# Patient Record
Sex: FEMALE | Race: WHITE | NOT HISPANIC OR LATINO | URBAN - METROPOLITAN AREA
[De-identification: names, ages, dates, MRNs, and addresses within clinical notes are randomized per-mention and may not be internally consistent; named-entity substitution may affect disease eponyms.]

---

## 2024-10-24 ENCOUNTER — TELEHEALTH PROVIDED OTHER THAN IN PATIENT'S HOME (OUTPATIENT)
Dept: URBAN - METROPOLITAN AREA TELEHEALTH 12 | Facility: TELEHEALTH | Age: 24
End: 2024-10-24

## 2024-10-24 VITALS — WEIGHT: 175 LBS | HEIGHT: 65 IN

## 2024-10-24 DIAGNOSIS — R19.7 DIARRHEA, UNSPECIFIED: ICD-10-CM

## 2024-10-24 DIAGNOSIS — Z79.1 LONG TERM (CURRENT) USE OF NON-STEROIDAL ANTI-INFLAMMATORIES: ICD-10-CM

## 2024-10-24 DIAGNOSIS — K92.2 GASTROINTESTINAL HEMORRHAGE, UNSPECIFIED: ICD-10-CM

## 2024-10-24 DIAGNOSIS — R53.83 OTHER FATIGUE: ICD-10-CM

## 2024-10-24 DIAGNOSIS — K92.1 MELENA: ICD-10-CM

## 2024-10-24 DIAGNOSIS — R11.2 NAUSEA WITH VOMITING, UNSPECIFIED: ICD-10-CM

## 2024-10-24 PROCEDURE — 99204 OFFICE O/P NEW MOD 45 MIN: CPT | Mod: 95 | Performed by: INTERNAL MEDICINE

## 2024-10-24 RX ORDER — OMEPRAZOLE 20 MG/1
TABLET, DELAYED RELEASE ORAL
Qty: 30 | Refills: 3 | Status: ACTIVE
Start: 2024-10-24

## 2025-02-18 ENCOUNTER — TELEHEALTH PROVIDED IN PATIENT'S HOME (OUTPATIENT)
Dept: URBAN - METROPOLITAN AREA TELEHEALTH 12 | Facility: TELEHEALTH | Age: 25
End: 2025-02-18
Payer: COMMERCIAL

## 2025-02-18 VITALS — WEIGHT: 170 LBS | HEIGHT: 65 IN

## 2025-02-18 DIAGNOSIS — R19.5 OTHER FECAL ABNORMALITIES: ICD-10-CM

## 2025-02-18 DIAGNOSIS — D62 ACUTE POSTHEMORRHAGIC ANEMIA: ICD-10-CM

## 2025-02-18 DIAGNOSIS — R19.7 DIARRHEA, UNSPECIFIED: ICD-10-CM

## 2025-02-18 DIAGNOSIS — K51.019 ULCERATIVE (CHRONIC) PANCOLITIS WITH UNSPECIFIED COMPLICATIO: ICD-10-CM

## 2025-02-18 DIAGNOSIS — D84.9 IMMUNODEFICIENCY, UNSPECIFIED: ICD-10-CM

## 2025-02-18 DIAGNOSIS — E66.3 OVERWEIGHT: ICD-10-CM

## 2025-02-18 PROCEDURE — 99214 OFFICE O/P EST MOD 30 MIN: CPT | Mod: 95 | Performed by: PHYSICIAN ASSISTANT

## 2025-02-18 NOTE — SERVICENOTES
Patient was located in their home during visit., I spent 25 minutes today reviewing the chart, talking with the patient, reviewing previous results with patient, discussing plan, and documenting. Patient understands and agrees with plan. Questions/concerns addressed.

## 2025-02-18 NOTE — SERVICEHPINOTES
PATIENT VERIFIED BY DATE OF BIRTH AND NAME. Patient has been consented for this telecommunication visit using [employer] application.   Pt is here for f/u to starting Humira. She started Humira--started first dose 1/24/2025--she already feels "normal".  
yoseph hameed
Currently, pt has regular/formed BMs--now has 2 BM per day--was having up to 14 BMs daily prior to being on Humira. No blood per rectum, abdominal pain, nausea, vomiting, fever, chills, joint pain, or eye pain. 
yoseph hameed
Pt advised to get a yrly skin check, yearly pap smear, non live flu shot, pneumonia shot every 5 yrs. Pt needs hep B vaccine series, and she is on a daily vitamin D supplement. 
yoseph hameedPt is on a PPI x 1 per day down from BID. Doing well with once a day dose. Pt is no longer experiencing any GERD symptoms. Pt will start to wean off of the once daily dose. 
yoseph hameed No SE from the Humira
yoseph hameed Pt asks questions about timing of her next procedure, plan for repeating fecal calpro, etc. 
yoseph hameedROS as per HPI and o/w is unremarkable. No other GI related complaints today. yoseph hameed

## 2025-03-05 LAB
CBC WITH DIFFERENTIAL/PLATELET: BASO (ABSOLUTE): 0 X10E3/UL (ref 0–0.2)
CBC WITH DIFFERENTIAL/PLATELET: BASOS: 1 %
CBC WITH DIFFERENTIAL/PLATELET: EOS (ABSOLUTE): 0.1 X10E3/UL (ref 0–0.4)
CBC WITH DIFFERENTIAL/PLATELET: EOS: 2 %
CBC WITH DIFFERENTIAL/PLATELET: HEMATOCRIT: 37.5 % (ref 34–46.6)
CBC WITH DIFFERENTIAL/PLATELET: HEMATOLOGY COMMENTS: (no result)
CBC WITH DIFFERENTIAL/PLATELET: HEMOGLOBIN: 11.4 G/DL (ref 11.1–15.9)
CBC WITH DIFFERENTIAL/PLATELET: IMMATURE CELLS: (no result)
CBC WITH DIFFERENTIAL/PLATELET: IMMATURE GRANS (ABS): 0 X10E3/UL (ref 0–0.1)
CBC WITH DIFFERENTIAL/PLATELET: IMMATURE GRANULOCYTES: 0 %
CBC WITH DIFFERENTIAL/PLATELET: LYMPHS (ABSOLUTE): 2.8 X10E3/UL (ref 0.7–3.1)
CBC WITH DIFFERENTIAL/PLATELET: LYMPHS: 47 %
CBC WITH DIFFERENTIAL/PLATELET: MCH: 23.7 PG — LOW (ref 26.6–33)
CBC WITH DIFFERENTIAL/PLATELET: MCHC: 30.4 G/DL — LOW (ref 31.5–35.7)
CBC WITH DIFFERENTIAL/PLATELET: MCV: 78 FL — LOW (ref 79–97)
CBC WITH DIFFERENTIAL/PLATELET: MONOCYTES(ABSOLUTE): 0.5 X10E3/UL (ref 0.1–0.9)
CBC WITH DIFFERENTIAL/PLATELET: MONOCYTES: 9 %
CBC WITH DIFFERENTIAL/PLATELET: NEUTROPHILS (ABSOLUTE): 2.4 X10E3/UL (ref 1.4–7)
CBC WITH DIFFERENTIAL/PLATELET: NEUTROPHILS: 41 %
CBC WITH DIFFERENTIAL/PLATELET: NRBC: (no result)
CBC WITH DIFFERENTIAL/PLATELET: PLATELETS: 289 X10E3/UL (ref 150–450)
CBC WITH DIFFERENTIAL/PLATELET: RBC: 4.82 X10E6/UL (ref 3.77–5.28)
CBC WITH DIFFERENTIAL/PLATELET: RDW: 14.4 % (ref 11.7–15.4)
CBC WITH DIFFERENTIAL/PLATELET: WBC: 5.9 X10E3/UL (ref 3.4–10.8)
COMP. METABOLIC PANEL (14): ALBUMIN: 4.5 G/DL (ref 4–5)
COMP. METABOLIC PANEL (14): ALKALINE PHOSPHATASE: 62 IU/L (ref 44–121)
COMP. METABOLIC PANEL (14): ALT (SGPT): 5 IU/L (ref 0–32)
COMP. METABOLIC PANEL (14): AST (SGOT): 13 IU/L (ref 0–40)
COMP. METABOLIC PANEL (14): BILIRUBIN, TOTAL: 0.4 MG/DL (ref 0–1.2)
COMP. METABOLIC PANEL (14): BUN/CREATININE RATIO: 10 (ref 9–23)
COMP. METABOLIC PANEL (14): BUN: 8 MG/DL (ref 6–20)
COMP. METABOLIC PANEL (14): CALCIUM: 9.4 MG/DL (ref 8.7–10.2)
COMP. METABOLIC PANEL (14): CARBON DIOXIDE, TOTAL: 18 MMOL/L — LOW (ref 20–29)
COMP. METABOLIC PANEL (14): CHLORIDE: 107 MMOL/L — HIGH (ref 96–106)
COMP. METABOLIC PANEL (14): CREATININE: 0.77 MG/DL (ref 0.57–1)
COMP. METABOLIC PANEL (14): EGFR: 110 ML/MIN/1.73 (ref 59–?)
COMP. METABOLIC PANEL (14): GLOBULIN, TOTAL: 2.1 G/DL (ref 1.5–4.5)
COMP. METABOLIC PANEL (14): GLUCOSE: 81 MG/DL (ref 70–99)
COMP. METABOLIC PANEL (14): POTASSIUM: 4.7 MMOL/L (ref 3.5–5.2)
COMP. METABOLIC PANEL (14): PROTEIN, TOTAL: 6.6 G/DL (ref 6–8.5)
COMP. METABOLIC PANEL (14): SODIUM: 140 MMOL/L (ref 134–144)

## 2025-07-22 ENCOUNTER — TELEHEALTH PROVIDED IN PATIENT'S HOME (OUTPATIENT)
Dept: URBAN - METROPOLITAN AREA TELEHEALTH 12 | Facility: TELEHEALTH | Age: 25
End: 2025-07-22
Payer: COMMERCIAL

## 2025-07-22 VITALS — WEIGHT: 155 LBS | HEIGHT: 65 IN

## 2025-07-22 DIAGNOSIS — D84.9 IMMUNODEFICIENCY, UNSPECIFIED: ICD-10-CM

## 2025-07-22 DIAGNOSIS — R19.5 OTHER FECAL ABNORMALITIES: ICD-10-CM

## 2025-07-22 DIAGNOSIS — K51.019 ULCERATIVE (CHRONIC) PANCOLITIS WITH UNSPECIFIED COMPLICATIO: ICD-10-CM

## 2025-07-22 PROCEDURE — 99214 OFFICE O/P EST MOD 30 MIN: CPT | Mod: 95 | Performed by: PHYSICIAN ASSISTANT

## 2025-07-22 NOTE — SERVICEHPINOTES
PATIENT VERIFIED BY DATE OF BIRTH AND NAME. Patient has been consented for this telecommunication visit using [employer] application.   For about 10 days at the beginning of the month--she had GI symptoms. Symptoms include weird burps that tasted like carboard/plastic, almost bloating/gas type feeling, a little bit of abdominal cramping, and had two days worth of diarrhea. She thought she might have seen blood in her diarrhea (but only short lived). Once she mentioned her symptoms to us, they abated. Of note, her family members that she lives with had similar symptoms around this time but theirs lasted less time than hers did. Her fecal calprotectin was 3000 prior to starting Humira. Now her fecal calprotectin level is 545 but her fecal calprotectin had been    276. Stool studies were negative for infection. If this was a flare up, yoseph hameed 
Pt has drastically changed her diet. She's cut out processed foods, sugar. She eats dairy minimally. She doesn't get her vitamin D levels or other labs checked routinely. She is caffeine free completely. Doesn't take NSAIDS. She believes that her vitamin D levels are likely fine as she is outside a lot and takes vitamin D supplementation. 
yoseph hameed Pt denies constipation, diarrhea, abdominal pain, blood per rectum or in the stool. No nausea, vomiting, anorexia, 
yoseph hameed No other GI related complaints today. ROS as per HPI and o/w is unremarkable. yoseph hameed

## 2025-07-22 NOTE — SERVICENOTES
Patient was located in their home during visit., I spent 25 minutes today reviewing the chart, talking with the patient, reviewing previous results with patient, discussing plan, and documenting. Patient understands and agrees with plan. Questions/concerns addressed., Patient's visit was conducted through [employer] video telecommunication. Patient consented before the start of visit as to understanding of privacy concerns, possible technological failure, and their responsibility of carrying out instructions of plan.